# Patient Record
Sex: FEMALE | Race: WHITE | URBAN - METROPOLITAN AREA
[De-identification: names, ages, dates, MRNs, and addresses within clinical notes are randomized per-mention and may not be internally consistent; named-entity substitution may affect disease eponyms.]

---

## 2022-03-11 ENCOUNTER — TELEHEALTH PROVIDED IN PATIENT'S HOME (OUTPATIENT)
Dept: URBAN - METROPOLITAN AREA TELEHEALTH 4 | Facility: TELEHEALTH | Age: 57
End: 2022-03-11

## 2022-03-11 DIAGNOSIS — K86.89 OTHER SPECIFIED DISEASES OF PANCREAS: ICD-10-CM

## 2022-03-11 PROCEDURE — 99204 OFFICE O/P NEW MOD 45 MIN: CPT | Performed by: INTERNAL MEDICINE

## 2022-03-14 ENCOUNTER — LAB (OUTPATIENT)
Dept: LAB | Facility: HOSPITAL | Age: 57
End: 2022-03-14

## 2022-03-14 PROCEDURE — U0005 INFEC AGEN DETEC AMPLI PROBE: HCPCS

## 2022-03-14 PROCEDURE — U0004 COV-19 TEST NON-CDC HGH THRU: HCPCS

## 2022-03-14 PROCEDURE — C9803 HOPD COVID-19 SPEC COLLECT: HCPCS

## 2022-03-15 ENCOUNTER — ANESTHESIA (OUTPATIENT)
Dept: GASTROENTEROLOGY | Facility: HOSPITAL | Age: 57
End: 2022-03-15

## 2022-03-15 ENCOUNTER — ON CAMPUS - OUTPATIENT (OUTPATIENT)
Dept: URBAN - METROPOLITAN AREA HOSPITAL 85 | Facility: HOSPITAL | Age: 57
End: 2022-03-15

## 2022-03-15 ENCOUNTER — ANESTHESIA EVENT (OUTPATIENT)
Dept: GASTROENTEROLOGY | Facility: HOSPITAL | Age: 57
End: 2022-03-15

## 2022-03-15 ENCOUNTER — HOSPITAL ENCOUNTER (OUTPATIENT)
Facility: HOSPITAL | Age: 57
Setting detail: HOSPITAL OUTPATIENT SURGERY
Discharge: HOME | End: 2022-03-15
Attending: INTERNAL MEDICINE | Admitting: INTERNAL MEDICINE

## 2022-03-15 VITALS
RESPIRATION RATE: 9 BRPM | OXYGEN SATURATION: 100 % | SYSTOLIC BLOOD PRESSURE: 116 MMHG | DIASTOLIC BLOOD PRESSURE: 64 MMHG | HEART RATE: 76 BPM

## 2022-03-15 VITALS
SYSTOLIC BLOOD PRESSURE: 100 MMHG | HEART RATE: 69 BPM | OXYGEN SATURATION: 99 % | TEMPERATURE: 98 F | HEIGHT: 64 IN | RESPIRATION RATE: 16 BRPM | BODY MASS INDEX: 19.63 KG/M2 | WEIGHT: 115 LBS | DIASTOLIC BLOOD PRESSURE: 64 MMHG

## 2022-03-15 DIAGNOSIS — K31.89 OTHER DISEASES OF STOMACH AND DUODENUM: ICD-10-CM

## 2022-03-15 DIAGNOSIS — K86.9 DISEASE OF PANCREAS, UNSPECIFIED: ICD-10-CM

## 2022-03-15 DIAGNOSIS — R13.10 DYSPHAGIA, UNSPECIFIED: ICD-10-CM

## 2022-03-15 DIAGNOSIS — C25.1 MALIGNANT NEOPLASM OF BODY OF PANCREAS: ICD-10-CM

## 2022-03-15 DIAGNOSIS — K86.9 PANCREATIC LESION: ICD-10-CM

## 2022-03-15 DIAGNOSIS — K25.9 GASTRIC ULCER, UNSPECIFIED AS ACUTE OR CHRONIC, WITHOUT HEMO: ICD-10-CM

## 2022-03-15 DIAGNOSIS — R11.0 NAUSEA: ICD-10-CM

## 2022-03-15 DIAGNOSIS — K29.70 GASTRITIS, UNSPECIFIED, WITHOUT BLEEDING: ICD-10-CM

## 2022-03-15 LAB
GLUCOSE BLDC GLUCOMTR-MCNC: 156 MG/DL (ref 70–105)
SARS-COV-2 ORF1AB RESP QL NAA+PROBE: NOT DETECTED

## 2022-03-15 PROCEDURE — 43450 DILATE ESOPHAGUS 1/MULT PASS: CPT | Performed by: INTERNAL MEDICINE

## 2022-03-15 PROCEDURE — 25010000002 PROPOFOL 500 MG/50ML EMULSION: Performed by: ANESTHESIOLOGY

## 2022-03-15 PROCEDURE — 82962 GLUCOSE BLOOD TEST: CPT

## 2022-03-15 PROCEDURE — 25010000002 LEVOFLOXACIN PER 250 MG

## 2022-03-15 PROCEDURE — 88172 CYTP DX EVAL FNA 1ST EA SITE: CPT | Performed by: INTERNAL MEDICINE

## 2022-03-15 PROCEDURE — 43242 EGD US FINE NEEDLE BX/ASPIR: CPT | Performed by: INTERNAL MEDICINE

## 2022-03-15 PROCEDURE — 25010000002 MIDAZOLAM PER 1 MG: Performed by: ANESTHESIOLOGY

## 2022-03-15 PROCEDURE — 88305 TISSUE EXAM BY PATHOLOGIST: CPT | Performed by: INTERNAL MEDICINE

## 2022-03-15 PROCEDURE — 43239 EGD BIOPSY SINGLE/MULTIPLE: CPT | Mod: 59 | Performed by: INTERNAL MEDICINE

## 2022-03-15 RX ORDER — MIDAZOLAM HYDROCHLORIDE 1 MG/ML
INJECTION INTRAMUSCULAR; INTRAVENOUS AS NEEDED
Status: DISCONTINUED | OUTPATIENT
Start: 2022-03-15 | End: 2022-03-15 | Stop reason: SURG

## 2022-03-15 RX ORDER — ONDANSETRON 2 MG/ML
4 INJECTION INTRAMUSCULAR; INTRAVENOUS ONCE AS NEEDED
Status: DISCONTINUED | OUTPATIENT
Start: 2022-03-15 | End: 2022-03-15 | Stop reason: HOSPADM

## 2022-03-15 RX ORDER — METRONIDAZOLE 500 MG/100ML
INJECTION, SOLUTION INTRAVENOUS
Status: COMPLETED
Start: 2022-03-15 | End: 2022-03-15

## 2022-03-15 RX ORDER — PROPOFOL 10 MG/ML
INJECTION, EMULSION INTRAVENOUS AS NEEDED
Status: DISCONTINUED | OUTPATIENT
Start: 2022-03-15 | End: 2022-03-15 | Stop reason: SURG

## 2022-03-15 RX ORDER — SODIUM CHLORIDE 0.9 % (FLUSH) 0.9 %
10 SYRINGE (ML) INJECTION EVERY 12 HOURS SCHEDULED
Status: DISCONTINUED | OUTPATIENT
Start: 2022-03-15 | End: 2022-03-15 | Stop reason: HOSPADM

## 2022-03-15 RX ORDER — LEVOFLOXACIN 5 MG/ML
INJECTION, SOLUTION INTRAVENOUS
Status: COMPLETED
Start: 2022-03-15 | End: 2022-03-15

## 2022-03-15 RX ORDER — SODIUM CHLORIDE 9 MG/ML
INJECTION, SOLUTION INTRAVENOUS CONTINUOUS PRN
Status: DISCONTINUED | OUTPATIENT
Start: 2022-03-15 | End: 2022-03-15 | Stop reason: SURG

## 2022-03-15 RX ORDER — GLYCOPYRROLATE 1 MG/5 ML
SYRINGE (ML) INTRAVENOUS AS NEEDED
Status: DISCONTINUED | OUTPATIENT
Start: 2022-03-15 | End: 2022-03-15 | Stop reason: SURG

## 2022-03-15 RX ORDER — LIDOCAINE HYDROCHLORIDE 10 MG/ML
INJECTION, SOLUTION EPIDURAL; INFILTRATION; INTRACAUDAL; PERINEURAL AS NEEDED
Status: DISCONTINUED | OUTPATIENT
Start: 2022-03-15 | End: 2022-03-15 | Stop reason: SURG

## 2022-03-15 RX ORDER — SODIUM CHLORIDE 0.9 % (FLUSH) 0.9 %
10 SYRINGE (ML) INJECTION AS NEEDED
Status: DISCONTINUED | OUTPATIENT
Start: 2022-03-15 | End: 2022-03-15 | Stop reason: HOSPADM

## 2022-03-15 RX ADMIN — SODIUM CHLORIDE: 0.9 INJECTION, SOLUTION INTRAVENOUS at 10:34

## 2022-03-15 RX ADMIN — METRONIDAZOLE 500 MG: 500 INJECTION, SOLUTION INTRAVENOUS at 11:11

## 2022-03-15 RX ADMIN — Medication 0.2 MG: at 10:32

## 2022-03-15 RX ADMIN — MIDAZOLAM 2 MG: 1 INJECTION INTRAMUSCULAR; INTRAVENOUS at 10:33

## 2022-03-15 RX ADMIN — LEVOFLOXACIN 500 MG: 500 INJECTION, SOLUTION INTRAVENOUS at 11:22

## 2022-03-15 RX ADMIN — PROPOFOL 700 MG: 10 INJECTION, EMULSION INTRAVENOUS at 10:32

## 2022-03-15 RX ADMIN — LIDOCAINE HYDROCHLORIDE 50 MG: 10 INJECTION, SOLUTION EPIDURAL; INFILTRATION; INTRACAUDAL at 10:32

## 2022-03-15 NOTE — ANESTHESIA POSTPROCEDURE EVALUATION
Patient: Ellie Menon    Procedure Summary     Date: 03/15/22 Room / Location: [facility] ENDOSCOPY 2 / [facility] ENDOSCOPY    Anesthesia Start: 1031 Anesthesia Stop: 1115    Procedure: EGD WITH BIOPSY X1, DILATION (BOUGIE 52) AREA AND ULTRASOUND WITH FINE NEEDLE BIOPSY X1 (N/A ) Diagnosis:       Pancreatic lesion      (Pancreatic lesion [K86.9])    Surgeons: Maynor Pennington MD Provider: Beatrice Thomas MD    Anesthesia Type: MAC ASA Status: 3          Anesthesia Type: MAC    Vitals  Vitals Value Taken Time   /64 03/15/22 1200   Temp     Pulse 69 03/15/22 1200   Resp 16 03/15/22 1200   SpO2 99 % 03/15/22 1200           Post Anesthesia Care and Evaluation    Patient location during evaluation: PACU  Patient participation: complete - patient participated  Level of consciousness: awake and alert  Pain management: adequate  Airway patency: patent  Anesthetic complications: No anesthetic complications  PONV Status: none  Cardiovascular status: acceptable  Respiratory status: acceptable  Hydration status: acceptable

## 2022-03-15 NOTE — ADDENDUM NOTE
Addendum  created 03/15/22 1607 by Beatrice Thomas MD    Review and Sign - Ready for Procedure

## 2022-03-15 NOTE — ANESTHESIA PREPROCEDURE EVALUATION
Anesthesia Evaluation     Patient summary reviewed and Nursing notes reviewed   no history of anesthetic complications:  NPO Solid Status: > 8 hours  NPO Liquid Status: > 8 hours           Airway   Mallampati: II  TM distance: >3 FB  Neck ROM: full  No difficulty expected  Dental      Pulmonary - normal exam   (+) a smoker Current, asthma,  Cardiovascular - normal exam  Exercise tolerance: good (4-7 METS)    (+) valvular problems/murmurs murmur,   (-) past MI, CAD      Neuro/Psych  (+) seizures poorly controlled, tremors,    GI/Hepatic/Renal/Endo    (+)  GERD,  liver disease, diabetes mellitus,     Musculoskeletal     (+) arthralgias,   Abdominal    Substance History - negative use     OB/GYN          Other   arthritis,    history of cancer    ROS/Med Hx Other: Hx vulvar cancer  Pelvic mass  Pancreatic mass  Weight loss                Anesthesia Plan    ASA 3     MAC     intravenous induction     Anesthetic plan, all risks, benefits, and alternatives have been provided, discussed and informed consent has been obtained with: patient.        CODE STATUS:

## 2022-03-15 NOTE — OP NOTE
"ESOPHAGOGASTRODUODENOSCOPY WITH ULTRASOUND AND FINE NEEDLE ASPIRATION Procedure Report    Patient Name:  Ellie Menon  YOB: 1965    Date of Surgery:  3/15/2022     Pre-Op Diagnosis:  Pancreatic lesion [K86.9]   Dysphagia  Nausea       Post-Op Diagnosis Codes:     * Pancreatic lesion [K86.9]  Gastritis    Procedure/CPT® Codes:      Procedure(s):  EGD WITH BIOPSY X1, DILATION (BOUGIE 52) AREA AND ULTRASOUND WITH FINE NEEDLE BIOPSY X1    Staff:  Surgeon(s):  Maynor Pennington MD      Anesthesia: Monitored Anesthesia Care    Description of Procedure:  A description of the procedure as well as risks, benefits and alternative methods were explained to the patient who voiced understanding and signed the corresponding consent form. A physical exam was performed and vital signs were monitored throughout the procedure.    An upper GI endoscope was placed into the mouth and proceeded through the esophagus, stomach and second portion of the duodenum without difficulty. The scope was then retroflexed and the fundus was visualized. The procedure was not difficult and there were no immediate complications.    Next, a Linear echoendoscope was advanced into the mouth, esophagus, and into the stomach. The celiac axis was visualized, next the scope was used to visualize the pancreatic neck, body, and tail as well as the L lobe of the liver. The scope was advanced into the duodenal bulb where the pancreatic head and ampulla were visualized as well as the biliary tree and R lobe of the liver. The scope was then advanced to the second portion of the duodenum where the uncinate was brought into view and the ampulla in the \" kissing the papilla\" view.  There was no blood loss.       Impression:    EGD Findings:   1.  Normal esophageal mucosa entire esophagus, a 51 Citizen of the [place] nonguided bougie dilator was advanced blindly into the esophagus with minimal to no resistance no trauma upon relook.  2.  Erythema in the antrum and body " suggestive of nonerosive gastritis, cold forcep biopsies were taken for histopathology and H. pylori  3.  Normal duodenum mucosa visualized to D3    EUS Findings:   1.  Pancreatic body mass measuring 2.9 x 2.6 cm that encases a smaller artery that is one of the branches off of the celiac trunk.  It was traced back and I think that this is the splenic artery.  2.  No celiac axis lymph nodes  3.  A small 6.3 mm hypoechoic area in the pancreatic head that may be a lymph node  4.  Cirrhotic appearing liver with very coarsened echotexture and external nodular appearance  5.  Normal biliary tree      Recommendations:  1. No NSAIDs or blood thinners for 7 days.   2. Follow up bx results   3.  Follow-up with Dr. Aleta Pennington MD     Date: 3/15/2022    Time: 11:19 EDT

## 2022-03-15 NOTE — H&P
GI CONSULT  NOTE:    Referring Provider:    Provider, No Known  [unfilled]    Chief complaint: <principal problem not specified>    Subjective .   Pancreatic mass    Pre op diagnosis  Pancreatic mass      History of present illness:      Ellie Menon is a 56 y.o. female who presents today for Procedure(s):  EUS W FNA for the indications listed below.     The updated Patient Profile was reviewed prior to the procedure, in conjunction with the Physical Exam, including medical conditions, surgical procedures, medications, allergies, family history and social history.     Pre-operatively, I reviewed the indication(s) for the procedure, the risks of the procedure [including but not limited to: unexpected bleeding possibly requiring hospitalization and/or unplanned repeat procedures, perforation possibly requiring surgical treatment, missed lesions and complications of sedation/MAC (also explained by anesthesia staff)].     I have evaluated the patient for risks associated with the planned anesthesia and the procedure to be performed and find the patient an acceptable candidate for IV sedation.    Multiple opportunities were provided for any questions or concerns, and all questions were answered satisfactorily before any anesthesia was administered. We will proceed with the planned procedure.    Past Medical History:  Past Medical History:   Diagnosis Date   • Asthma    • Cancer (HCC)    • Diabetes mellitus (HCC)    • Epilepsy (HCC)    • GERD (gastroesophageal reflux disease)    • Heart murmur    • Liver disease    • Seizures (HCC)        Past Surgical History:  Past Surgical History:   Procedure Laterality Date   • CHOLECYSTECTOMY     • ENDOSCOPY     • EYE SURGERY     • TONSILLECTOMY     • VAGUS NERVE STIMULATOR IMPLANTATION     • VULVECTOMY         Social History:  Social History     Tobacco Use   • Smoking status: Current Every Day Smoker     Packs/day: 0.50     Types: Cigarettes   • Smokeless tobacco: Never  Used   Substance Use Topics   • Alcohol use: Not Currently   • Drug use: Never       Family History:  History reviewed. No pertinent family history.    Medications:  Medications Prior to Admission   Medication Sig Dispense Refill Last Dose   • albuterol sulfate  (90 Base) MCG/ACT inhaler Inhale 2 puffs Every 6 (Six) Hours As Needed.      • dexlansoprazole (DEXILANT) 60 MG capsule Take 60 mg by mouth.      • fluticasone-salmeterol (ADVAIR) 100-50 MCG/DOSE DISKUS Inhale 1 puff.      • levETIRAcetam (KEPPRA) 1000 MG tablet Take 1,000 mg by mouth.      • levETIRAcetam (KEPPRA) 500 MG tablet Take 500 mg by mouth.      • LORazepam (ATIVAN) 0.5 MG tablet TAKE 1 TAB BY MOUTH EVERY NIGHT      • LORazepam (ATIVAN) 0.5 MG tablet Take 0.5 mg by mouth Every 8 (Eight) Hours As Needed for Anxiety.      • metFORMIN (GLUMETZA) 1000 MG (MOD) 24 hr tablet Take 1,000 mg by mouth.      • omeprazole (prilOSEC) 10 MG capsule Take 20 mg by mouth Daily.      • SITagliptin (JANUVIA) 100 MG tablet Take 100 mg by mouth.      • vitamin E 400 UNIT capsule Take 400 Units by mouth.      • vitamin E 400 UNIT capsule Take 400 Units by mouth.      • Aspirin Buf,CaCarb-MgCarb-MgO, 81 MG tablet Take 81 mg by mouth Daily.      • Aspirin-Calcium Carbonate  MG tablet Take 81 mg by mouth.      • diazePAM (VALIUM) 10 MG tablet Take 10 mg by mouth 3 (Three) Times a Day.      • lacosamide (VIMPAT) 100 MG tablet tablet Take 100 mg by mouth.          Scheduled Meds:  Continuous Infusions:No current facility-administered medications for this encounter.    PRN Meds:.    ALLERGIES:  Clonazepam, Codeine, Phenobarbital, Phenytoin, Carbamazepine, and Lacosamide    ROS:  The following systems were reviewed and negative;  Constitution:  No fevers, chills, no unintentional weight loss  Skin: no rash, no jaundice  Eyes:  No blurry vision, no eye pain  HENT:  No change in hearing or smell  Resp:  No dyspnea or cough  CV:  No chest pain or palpitations  :   "No dysuria, hematuria  Musculoskeletal:  No leg cramps or arthralgias  Neuro:  No tremor, no numbness  Psych:  No depression or confsusion    Objective     Vital Signs:   Vitals:    03/14/22 1403   Weight: 52.2 kg (115 lb)   Height: 162.6 cm (64\")       Physical Exam:       General Appearance:    Awake and alert, in no acute distress   Head:    Normocephalic, without obvious abnormality, atraumatic   Throat:   No oral lesions, no thrush, oral mucosa moist   Lungs:     respirations regular, even and unlabored   Skin:   No rash, no jaundice       Results Review:  Lab Results (last 24 hours)     ** No results found for the last 24 hours. **          Imaging Results (Last 24 Hours)     ** No results found for the last 24 hours. **           I reviewed the patient's labs and imaging.    ASSESSMENT AND PLAN:      Active Problems:    * No active hospital problems. *       Procedure(s):  EUS W FNA      I discussed the patient's findings and my recommendations with the patient.    Maynor Pennington MD  03/15/22  10:00 EDT              "

## 2022-03-15 NOTE — DISCHARGE INSTRUCTIONS
A responsible adult should stay with you and you should rest quietly for the rest of the day.    Do not drink alcohol, drive, operate any heavy machinery or power tools or make any legal/important decisions for the next 24 hours.     Progress your diet as tolerated.  If you begin to experience severe pain, increased shortness of breath, racing heartbeat or a fever above 101 F, seek immediate medical attention.     Follow up with MD as instructed. Call office for results in 3 to 5 days if needed.    [phone]      Recommendations:  1. No NSAIDs or blood thinners for 7 days.   2. Follow up bx results   3.  Follow-up with Dr. River

## 2022-03-16 LAB
LAB AP CASE REPORT: NORMAL
PATH REPORT.FINAL DX SPEC: NORMAL
PATH REPORT.GROSS SPEC: NORMAL

## 2022-04-19 LAB
LAB AP CARIS, ADDENDUM: NORMAL
LAB AP CASE REPORT: NORMAL
Lab: NORMAL
PATH REPORT.FINAL DX SPEC: NORMAL
PATH REPORT.GROSS SPEC: NORMAL

## (undated) DEVICE — BITEBLOCK ENDO W/STRAP 60F A/ LF DISP

## (undated) DEVICE — PK ENDO GI 50

## (undated) DEVICE — ENDOSCOPIC ULTRASOUND FINE NEEDLE BIOPSY (FNB) DEVICE: Brand: ACQUIRE

## (undated) DEVICE — SINGLE-USE BIOPSY FORCEPS: Brand: RADIAL JAW 4